# Patient Record
(demographics unavailable — no encounter records)

---

## 2024-12-30 NOTE — DISCUSSION/SUMMARY
[Patient encouraged to discuss with Primary MD] : I encouraged the patient to discuss these important issues with ~his/her~ primary care doctor [Goals and Counseling] : I have reviewed the goals of stroke risk factor modification. I counseled the patient on measures to reduce stroke risk, including the importance of medication compliance, risk factor control, exercise, healthy diet and avoidance of smoking. I reviewed stroke warning signs and symptoms and appropriate actions to take if such occur.

## 2025-01-02 NOTE — ASSESSMENT
[FreeTextEntry1] : Ms. Tang is an 80-year-old RH woman with PMH of TIAs (x3 about 9-10 years ago), R CEA (~2019, unsure of Dr's name), HTN, HLD, pre-DM, COPD/asthma, HF, UC, Heart murmur, PVD s/p stent to SFA, Iron deficiency Anemia, thalassemia trait, and CKD, who presents today, accompanied by daughter for a hospital follow up visit for TIA.  # TIA: Initially presented to the ED on 12/11 for 2 episodes of transient L hand paresthesia and aphasia. MRI negative for acute ischemia, CTA H/N w/o flow limiting stenosis. TTE with thickening of tricuspid valve, possible vegetation. JUAREZ: EF: 55-60%, Normal LA, no vegetation or thrombus, (-) PFO. HgA1C: 5.8% LDL: 142, TSH: 2.33 NIHSS: 0 mRS: 0   Plan - Cont. ASA & Clopidogrel as prescribed (Advised that Omeprazole interacts with Clopidogrel efficacy and can be switched to Pantoprazole, states she will f/u with her PCP) - Optimize Pre-DM HgA1C (5.8%) - Optimize BP <130/80 mm Hg  - Cont. PT exercises/staying active - F/U with pulm for ONEAL (appt on 01/22) - F/U with heme/onc re: fatigue - F/U cardio - F/U nephro - Optimize sleep hygiene and hydration - Counselled on signs of stroke BEFAST and to call 911 with any new or worsening neurological symptoms    # Transaminitis (AST: 68 ALT: 70) - Obtain CMP  - Will decrease Atorvastatin from 80 mg to 40 mg, LDL goal <70 (142)  - Counselled on f/u with PCP regarding regular health maintenance and prevention, including routine screenings  - Advised to call back with any other complaints or concerns     F/U in 3 mos.   Case discussed with Dr. Adam

## 2025-01-02 NOTE — DATA REVIEWED
[de-identified] : MRI: No acute infarction, intracranial hemorrhage, or space-occupying lesion. Chronic right corona radiata infarcts, some of which are new since the prior MRI dated 8/10/2012. Interval progression of mild chronic microvascular ischemic changes. [de-identified] : CTH: No acute territorial infarct, intracranial hemorrhage, or midline shift.   CTP: No perfusion deficits to suggest areas of completed infarction or at risk territory.   CTA HEAD/NECK: No large vessel occlusion, aneurysm, or vascular malformation.   TTE:   Summary:  1. Normal global left ventricular systolic function.  2. LV Ejection Fraction by Moon's Method with a biplane EF of 68 %.  3. Mild asymmetric left ventricular hypertrophy involving the septal wall.  4. Spectral Doppler shows impaired relaxation pattern of left ventricular myocardial filling (Grade I diastolic dysfunction).  5. Normal right ventricular size and function.  6. Sclerotic aortic valve with normal opening.  7. There is thickening of the TV leaflet, which appears increased from the previous study 11/4/2023. Cannot rule out vegetation. Recommend JUAREZ, if clinically indicated.  8. Mild tricuspid regurgitation.  9. Mild pulmonic valve regurgitation.  10. Normal pulmonary artery pressure.   JUAREZ: Summary:  1. Left ventricular ejection fraction, by visual estimation, is 55 to 60%.  2. Normal global left ventricular systolic function.  3. Mildly increased LV wall thickness.  4. Normal left atrial size.  5. Normal right atrial size.  6. Mild mitral valve regurgitation.  7. Mild tricuspid regurgitation.  8. Color flow doppler and intravenous injection of agitated saline demonstrates the presence of an intact intra atrial septum.  9. No evidence of vegetation on this study.  10. No evidence of cardiac source of embolism on this study.  Pertinent labs: eGFR: 30 Creatinine: 1.7, AST: 68 ALT: 70 HgA1C: 5.8, TSH: 2.33, Cholesterol: 217, LDL: 142

## 2025-01-02 NOTE — DATA REVIEWED
[de-identified] : MRI: No acute infarction, intracranial hemorrhage, or space-occupying lesion. Chronic right corona radiata infarcts, some of which are new since the prior MRI dated 8/10/2012. Interval progression of mild chronic microvascular ischemic changes. [de-identified] : CTH: No acute territorial infarct, intracranial hemorrhage, or midline shift.   CTP: No perfusion deficits to suggest areas of completed infarction or at risk territory.   CTA HEAD/NECK: No large vessel occlusion, aneurysm, or vascular malformation.   TTE:   Summary:  1. Normal global left ventricular systolic function.  2. LV Ejection Fraction by Moon's Method with a biplane EF of 68 %.  3. Mild asymmetric left ventricular hypertrophy involving the septal wall.  4. Spectral Doppler shows impaired relaxation pattern of left ventricular myocardial filling (Grade I diastolic dysfunction).  5. Normal right ventricular size and function.  6. Sclerotic aortic valve with normal opening.  7. There is thickening of the TV leaflet, which appears increased from the previous study 11/4/2023. Cannot rule out vegetation. Recommend JUAREZ, if clinically indicated.  8. Mild tricuspid regurgitation.  9. Mild pulmonic valve regurgitation.  10. Normal pulmonary artery pressure.   JUAREZ: Summary:  1. Left ventricular ejection fraction, by visual estimation, is 55 to 60%.  2. Normal global left ventricular systolic function.  3. Mildly increased LV wall thickness.  4. Normal left atrial size.  5. Normal right atrial size.  6. Mild mitral valve regurgitation.  7. Mild tricuspid regurgitation.  8. Color flow doppler and intravenous injection of agitated saline demonstrates the presence of an intact intra atrial septum.  9. No evidence of vegetation on this study.  10. No evidence of cardiac source of embolism on this study.  Pertinent labs: eGFR: 30 Creatinine: 1.7, AST: 68 ALT: 70 HgA1C: 5.8, TSH: 2.33, Cholesterol: 217, LDL: 142

## 2025-01-02 NOTE — HISTORY OF PRESENT ILLNESS
[FreeTextEntry1] : Ms. Tang is an 80-year-old RH woman with PMH of TIAs (x3 about 9-10 years ago), R CEA (~2019, unsure of Dr's name), HTN, HLD, pre-DM, COPD/asthma, HF, UC, Heart murmur, PVD s/p stent to SFA, Iron deficiency Anemia, thalassemia trait, and CKD, who presents today, accompanied by daughter for a hospital follow up visit for TIA.   Patient initially presented to the ED on 12/10 for aphasia and L hand tingling from urgent care.  Neurological workup was negative for acute infarct. She states the night before she was reading a book and felt tingling in her L thumb which spread to her hand and she suddenly was not able to speak, this lasted about 5 mins, and she went to bed. The next morning, she states she was not feeling right and went to [facility] where she became aphasic again and was sent to the ED. Her symptoms resolved when she got to the hospital. She endorses she forgot to take both her ASA and Clopidogrel for 2 days prior to the event. She was on ASA/Clopidogrel as per her PCP/Cardiologist. She endorses compliance to all medications prior to forgetting the previous 2 days. She states her previous TIAs she experienced dizziness and generalized numbness.    Since discharge, she feels well. Denies any new neurological symptoms since event. Never smoker. Denies any illicit drug use. Completed PT and is continuing exercises independently. Denies any balance issues or falls. Had a virtual appt with her PCP after discharge and is pending in office appt. States her PCP usually checks her b/w every month. Endorses ongoing ONEAL which is slightly worsening. Has f/u appointment with her pulmonologist on January 22nd and pending f/u appt with her cardiologist. C/o chronic fatigue, has not f/u with heme/onc since August. Reports compliance with medications, tolerating well w/o issue. Able to complete ADLs independently. Reports eating about 1 meal a day, does not have a great appetite. Endorses poor fluid intake. Monitors BPs at home. States SBP usually ranges around 130s-150s. On [place], her SBP was in the 80s and she felt very tired. Her BP improved after increasing her fluid intake. Denies any snoring history or daytime sleepiness, averages about 5 hours of sleep a night. Denies any muscle cramps/weakness, dark urine, abdominal pain, or any further complaints.

## 2025-01-02 NOTE — DATA REVIEWED
[de-identified] : MRI: No acute infarction, intracranial hemorrhage, or space-occupying lesion. Chronic right corona radiata infarcts, some of which are new since the prior MRI dated 8/10/2012. Interval progression of mild chronic microvascular ischemic changes. [de-identified] : CTH: No acute territorial infarct, intracranial hemorrhage, or midline shift.   CTP: No perfusion deficits to suggest areas of completed infarction or at risk territory.   CTA HEAD/NECK: No large vessel occlusion, aneurysm, or vascular malformation.   TTE:   Summary:  1. Normal global left ventricular systolic function.  2. LV Ejection Fraction by Moon's Method with a biplane EF of 68 %.  3. Mild asymmetric left ventricular hypertrophy involving the septal wall.  4. Spectral Doppler shows impaired relaxation pattern of left ventricular myocardial filling (Grade I diastolic dysfunction).  5. Normal right ventricular size and function.  6. Sclerotic aortic valve with normal opening.  7. There is thickening of the TV leaflet, which appears increased from the previous study 11/4/2023. Cannot rule out vegetation. Recommend JUAREZ, if clinically indicated.  8. Mild tricuspid regurgitation.  9. Mild pulmonic valve regurgitation.  10. Normal pulmonary artery pressure.   JUAREZ: Summary:  1. Left ventricular ejection fraction, by visual estimation, is 55 to 60%.  2. Normal global left ventricular systolic function.  3. Mildly increased LV wall thickness.  4. Normal left atrial size.  5. Normal right atrial size.  6. Mild mitral valve regurgitation.  7. Mild tricuspid regurgitation.  8. Color flow doppler and intravenous injection of agitated saline demonstrates the presence of an intact intra atrial septum.  9. No evidence of vegetation on this study.  10. No evidence of cardiac source of embolism on this study.  Pertinent labs: eGFR: 30 Creatinine: 1.7, AST: 68 ALT: 70 HgA1C: 5.8, TSH: 2.33, Cholesterol: 217, LDL: 142

## 2025-01-02 NOTE — PHYSICAL EXAM
[Tremor] : a tremor present [FreeTextEntry1] : Mental Status: Alert and Attentive, oriented to person, place, and time. Speech is fluent with intact naming, repetition, and comprehension, no dysarthria. Recent and remote memory intact. Insight and judgment were intact, and the affect was normal. Follows commands. Attention/concentration intact. Fund of knowledge appropriate.   Cranial Nerves: VFF to confrontation, EOMi w/o nystagmus, pupils equally round and reactive to light, V1-V3 intact bilaterally and symmetric, face is symmetric with normal eye closure and smile, hearing is bilaterally intact to finger rub, uvula is midline and soft palate rises symmetrically, head turning, and shoulder shrug are symmetric, tongue protrudes midline.   Motor: MRC grading 5/5 bilateral UE/LE.  strength 5/5. Normal tone and bulk. No pronator drift.    Sensory exam: Sensation intact to light touch bilaterally in all extremities. No neglect or extinction on double simultaneous testing.   Coordination: No dysmetria on finger-to-nose. B/L UE postural tremor    Gait: Narrow and steady. No ataxia.    Deep tendon reflexes: Biceps right 2+. Biceps left 2+. Brachioradialis right 2+. Brachioradialis left 2+. Patella right 2+. Patella left 3+.

## 2025-01-02 NOTE — HISTORY OF PRESENT ILLNESS
[FreeTextEntry1] : Ms. Tang is an 80-year-old RH woman with PMH of TIAs (x3 about 9-10 years ago), R CEA (~2019, unsure of Dr's name), HTN, HLD, pre-DM, COPD/asthma, HF, UC, Heart murmur, PVD s/p stent to SFA, Iron deficiency Anemia, thalassemia trait, and CKD, who presents today, accompanied by daughter for a hospital follow up visit for TIA.   Patient initially presented to the ED on 12/10 for aphasia and L hand tingling from urgent care.  Neurological workup was negative for acute infarct. She states the night before she was reading a book and felt tingling in her L thumb which spread to her hand and she suddenly was not able to speak, this lasted about 5 mins, and she went to bed. The next morning, she states she was not feeling right and went to [facility] where she became aphasic again and was sent to the ED. Her symptoms resolved when she got to the hospital. She endorses she forgot to take both her ASA and Clopidogrel for 2 days prior to the event. She was on ASA/Clopidogrel as per her PCP/Cardiologist. She endorses compliance to all medications prior to forgetting the previous 2 days. She states her previous TIAs she experienced dizziness and generalized numbness.    Since discharge, she feels well. Denies any new neurological symptoms since event. Never smoker. Denies any illicit drug use. Completed PT and is continuing exercises independently. Denies any balance issues or falls. Had a virtual appt with her PCP after discharge and is pending in office appt. States her PCP usually checks her b/w every month. Endorses ongoing ONEAL which is slightly worsening. Has f/u appointment with her pulmonologist on January 22nd and pending f/u appt with her cardiologist. C/o chronic fatigue, has not f/u with heme/onc since August. Reports compliance with medications, tolerating well w/o issue. Able to complete ADLs independently. Reports eating about 1 meal a day, does not have a great appetite. Endorses poor fluid intake. Monitors BPs at home. States SBP usually ranges around 130s-150s. On Rhoda, her SBP was in the 80s and she felt very tired. Her BP improved after increasing her fluid intake. Denies any snoring history or daytime sleepiness, averages about 5 hours of sleep a night. Denies any muscle cramps/weakness, dark urine, abdominal pain, or any further complaints.

## 2025-01-02 NOTE — HISTORY OF PRESENT ILLNESS
[FreeTextEntry1] : Ms. Tang is an 80-year-old RH woman with PMH of TIAs (x3 about 9-10 years ago), R CEA (~2019, unsure of Dr's name), HTN, HLD, pre-DM, COPD/asthma, HF, UC, Heart murmur, PVD s/p stent to SFA, Iron deficiency Anemia, thalassemia trait, and CKD, who presents today, accompanied by daughter for a hospital follow up visit for TIA.   Patient initially presented to the ED on 12/10 for aphasia and L hand tingling from urgent care.  Neurological workup was negative for acute infarct. She states the night before she was reading a book and felt tingling in her L thumb which spread to her hand and she suddenly was not able to speak, this lasted about 5 mins, and she went to bed. The next morning, she states she was not feeling right and went to [facility] where she became aphasic again and was sent to the ED. Her symptoms resolved when she got to the hospital. She endorses she forgot to take both her ASA and Clopidogrel for 2 days prior to the event. She was on ASA/Clopidogrel as per her PCP/Cardiologist. She endorses compliance to all medications prior to forgetting the previous 2 days. She states her previous TIAs she experienced dizziness and generalized numbness.    Since discharge, she feels well. Denies any new neurological symptoms since event. Never smoker. Denies any illicit drug use. Completed PT and is continuing exercises independently. Denies any balance issues or falls. Had a virtual appt with her PCP after discharge and is pending in office appt. States her PCP usually checks her b/w every month. Endorses ongoing ONEAL which is slightly worsening. Has f/u appointment with her pulmonologist on January 22nd and pending f/u appt with her cardiologist. C/o chronic fatigue, has not f/u with heme/onc since August. Reports compliance with medications, tolerating well w/o issue. Able to complete ADLs independently. Reports eating about 1 meal a day, does not have a great appetite. Endorses poor fluid intake. Monitors BPs at home. States SBP usually ranges around 130s-150s. On Rhoda, her SBP was in the 80s and she felt very tired. Her BP improved after increasing her fluid intake. Denies any snoring history or daytime sleepiness, averages about 5 hours of sleep a night. Denies any muscle cramps/weakness, dark urine, abdominal pain, or any further complaints.

## 2025-01-24 NOTE — PHYSICAL EXAM
[No Acute Distress] : no acute distress [Nasal congestion] : nasal congestion [Normal Appearance] : normal appearance [No Neck Mass] : no neck mass [Normal Rate/Rhythm] : normal rate/rhythm [Normal S1, S2] : normal s1, s2 [No Murmurs] : no murmurs [No Resp Distress] : no resp distress [Clear to Auscultation Bilaterally] : clear to auscultation bilaterally [No Abnormalities] : no abnormalities [Benign] : benign [Normal Gait] : normal gait [No Clubbing] : no clubbing [No Cyanosis] : no cyanosis [No Edema] : no edema [FROM] : FROM [Normal Color/ Pigmentation] : normal color/ pigmentation [No Focal Deficits] : no focal deficits [Oriented x3] : oriented x3 [Normal Affect] : normal affect [TextBox_11] : Postnasal drip

## 2025-01-24 NOTE — ASSESSMENT
[FreeTextEntry1] : Assessment and Plan: - COPD (Chronic Obstructive Pulmonary Disease) : Nataly is experiencing exacerbation of her COPD symptoms likely due to discontinuation of her prevention inhaler Brio. Her complaint of shortness of breath and wheezing suggests lung worsening functioning. - Therapeutic Interventions: Recommend Nataly to call her insurance to find a cost-effective alternative to Brio. Once the medication is chosen, a prescription shall be provided.  - Diagnostic Tests: No additional tests required at this point.  - Referrals: None  - Patient Education: Reinforced the necessity of being on a daily inhaler and using the rescue inhaler as needed.  - Follow-Up: The patient is advised to follow-up in May or Nikki or sooner if needed. Once the neuro-consult gives the all-clear, the patient is to start a rehabilitation program at [place].

## 2025-01-24 NOTE — HISTORY OF PRESENT ILLNESS
[TextBox_4] :  Summary : Nataly Gillis is a patient with a history of Chronic Obstructive Pulmonary Disease (COPD) who had a recent hospital admission due to stroke-like symptoms. She is now experiencing respiratory distress. - Chief Complaint (CC) : Shortness of breath. - History of Present Illness : Nataly reports that she has been having difficulty breathing and using her rescue inhaler for relief. It appears the situation has been exasperated with discontinuation of her preventative inhaler medication 'Brio.' She reports feeling better when she uses her inhaler but recently ran out of Brio and has not refilled her prescription due to high cost. There is a history of stroke-like symptoms which resulted in hospital admission. This has limited her physical activity in the last three months.

## 2025-04-11 NOTE — HISTORY OF PRESENT ILLNESS
[FreeTextEntry1] : Interval hx: Ms. Tang is an 81-year-old RH woman who presents today, accompanied by family member, for a follow up visit.   Since last visit, denies any new neurological symptoms. Compliant with medications, tolerating well w/o issue. Endorses SOB. Saw her pulm and was given an inhaler but states it is very expensive and did not have it refilled. Seeing her PCP on Monday. Pending appointment with nephrologist Dr. Mendez on 04/26. Denies any further complaints.   Prior hx: Ms. Tang is an 80-year-old RH woman with PMH of TIAs (x3 about 9-10 years ago), R CEA (~2019, unsure of Dr's name), HTN, HLD, pre-DM, COPD/asthma, HF, UC, Heart murmur, PVD s/p stent to SFA, Iron deficiency Anemia, thalassemia trait, and CKD, who presents today, accompanied by daughter for a hospital follow up visit for TIA.  Patient initially presented to the ED on 12/10 for aphasia and L hand tingling from urgent care. Neurological workup was negative for acute infarct. She states the night before she was reading a book and felt tingling in her L thumb which spread to her hand and she suddenly was not able to speak, this lasted about 5 mins, and she went to bed. The next morning, she states she was not feeling right and went to [facility] where she became aphasic again and was sent to the ED. Her symptoms resolved when she got to the hospital. She endorses she forgot to take both her ASA and Clopidogrel for 2 days prior to the event. She was on ASA/Clopidogrel as per her PCP/Cardiologist. She endorses compliance to all medications prior to forgetting the previous 2 days. She states her previous TIAs she experienced dizziness and generalized numbness.  Since discharge, she feels well. Denies any new neurological symptoms since event. Never smoker. Denies any illicit drug use. Completed PT and is continuing exercises independently. Denies any balance issues or falls. Had a virtual appt with her PCP after discharge and is pending in office appt. States her PCP usually checks her b/w every month. Endorses ongoing ONEAL which is slightly worsening. Has f/u appointment with her pulmonologist on January 22nd and pending f/u appt with her cardiologist. C/o chronic fatigue, has not f/u with heme/onc since August. Reports compliance with medications, tolerating well w/o issue. Able to complete ADLs independently. Reports eating about 1 meal a day, does not have a great appetite. Endorses poor fluid intake. Monitors BPs at home. States SBP usually ranges around 130s-150s. On Christmas, her SBP was in the 80s and she felt very tired. Her BP improved after increasing her fluid intake. Denies any snoring history or daytime sleepiness, averages about 5 hours of sleep a night. Denies any muscle cramps/weakness, dark urine, abdominal pain, or any further complaints.

## 2025-04-11 NOTE — ASSESSMENT
[FreeTextEntry1] : Ms. Tang is an 80-year-old RH woman with PMH of TIAs (x3 about 9-10 years ago), R CEA (~2019, unsure of Dr's name), HTN, HLD, pre-DM, COPD/asthma, HF, UC, Heart murmur, PVD s/p stent to SFA, Iron deficiency Anemia, thalassemia trait, and CKD, who presents today, accompanied by daughter for a hospital follow up visit for TIA.  # TIA: Initially presented to the ED on 12/11 for 2 episodes of transient L hand paresthesia and aphasia. MRI negative for acute ischemia, CTA H/N w/o flow limiting stenosis. TTE with thickening of tricuspid valve, possible vegetation. JUAREZ: EF: 55-60%, Normal LA, no vegetation or thrombus, (-) PFO. NIHSS: 0 mRS: 0   Plan - Cont. DAPT as prescribed as per cardio  - Cont. Atorvastatin as prescribed, LDL goal <70  - Cont. HgA1C Optimization - F/U with pulm (appt on 07/17) - F/U cardio - F/U nephro (appt 04/26) - Counselled on f/u with PCP regarding routine health maintenance, prevention, screenings, and risk factor management  - Counselled on signs of stroke BEFAST and to call 911 with any new or worsening neurological symptoms    # Transaminitis -> resolved (AST: 68->34 ALT: 70->42)    F/U in 6 mos.

## 2025-04-11 NOTE — DISCUSSION/SUMMARY
[Lipid Lowering Therapy] : lipid lowering therapy [Patient encouraged to discuss with Primary MD] : I encouraged the patient to discuss these important issues with ~his/her~ primary care doctor [Goals and Counseling] : I have reviewed the goals of stroke risk factor modification. I counseled the patient on measures to reduce stroke risk, including the importance of medication compliance, risk factor control, exercise, healthy diet and avoidance of smoking. I reviewed stroke warning signs and symptoms and appropriate actions to take if such occur.

## 2025-04-11 NOTE — DATA REVIEWED
[de-identified] : MRI: No acute infarction, intracranial hemorrhage, or space-occupying lesion. Chronic right corona radiata infarcts, some of which are new since the prior MRI dated 8/10/2012. Interval progression of mild chronic microvascular ischemic changes. [de-identified] : CTH: No acute territorial infarct, intracranial hemorrhage, or midline shift.   CTP: No perfusion deficits to suggest areas of completed infarction or at risk territory.   CTA HEAD/NECK: No large vessel occlusion, aneurysm, or vascular malformation.   TTE:   Summary:  1. Normal global left ventricular systolic function.  2. LV Ejection Fraction by Moon's Method with a biplane EF of 68 %.  3. Mild asymmetric left ventricular hypertrophy involving the septal wall.  4. Spectral Doppler shows impaired relaxation pattern of left ventricular myocardial filling (Grade I diastolic dysfunction).  5. Normal right ventricular size and function.  6. Sclerotic aortic valve with normal opening.  7. There is thickening of the TV leaflet, which appears increased from the previous study 11/4/2023. Cannot rule out vegetation. Recommend JUAREZ, if clinically indicated.  8. Mild tricuspid regurgitation.  9. Mild pulmonic valve regurgitation.  10. Normal pulmonary artery pressure.   JUAREZ: Summary:  1. Left ventricular ejection fraction, by visual estimation, is 55 to 60%.  2. Normal global left ventricular systolic function.  3. Mildly increased LV wall thickness.  4. Normal left atrial size.  5. Normal right atrial size.  6. Mild mitral valve regurgitation.  7. Mild tricuspid regurgitation.  8. Color flow doppler and intravenous injection of agitated saline demonstrates the presence of an intact intra atrial septum.  9. No evidence of vegetation on this study.  10. No evidence of cardiac source of embolism on this study.

## 2025-04-11 NOTE — PHYSICAL EXAM
[FreeTextEntry1] : Mental Status: Alert and Attentive, oriented to person, place, and time. Speech is fluent with intact naming, repetition, and comprehension, no dysarthria. Recent and remote memory intact. Insight and judgment were intact, and the affect was normal. Follows commands. Attention/concentration intact. Fund of knowledge appropriate.   Cranial Nerves: VFF to confrontation, EOMi w/o nystagmus, pupils equally round and reactive to light, V1-V3 intact bilaterally and symmetric, face is symmetric with normal eye closure and smile, hearing is bilaterally intact to finger rub, uvula is midline and soft palate rises symmetrically, head turning, and shoulder shrug are symmetric, tongue protrudes midline.   Motor: MRC grading 5/5 bilateral UE/LE.  strength 5/5. Normal tone and bulk. No pronator drift.    Sensory exam: Sensation intact to light touch bilaterally in all extremities. No neglect or extinction on double simultaneous testing.   Coordination: No dysmetria on finger-to-nose. B/L UE postural tremor    Gait: Narrow and steady. No ataxia.    Deep tendon reflexes: Biceps right 2+. Biceps left 2+. Brachioradialis right 2+. Brachioradialis left 2+. Patella right 3+. Patella left 3+. no